# Patient Record
Sex: FEMALE | Race: BLACK OR AFRICAN AMERICAN | Employment: FULL TIME | URBAN - METROPOLITAN AREA
[De-identification: names, ages, dates, MRNs, and addresses within clinical notes are randomized per-mention and may not be internally consistent; named-entity substitution may affect disease eponyms.]

---

## 2024-07-09 ENCOUNTER — TELEPHONE (OUTPATIENT)
Age: 59
End: 2024-07-09

## 2024-07-09 NOTE — TELEPHONE ENCOUNTER
REFERRAL PENDING LABS, LISA ALLEN WE NEED LABS BEFORE AN MYRA CAN BE SCHEDULE. Fax request sent to [facility] [phone]

## 2024-07-15 ENCOUNTER — TELEPHONE (OUTPATIENT)
Age: 59
End: 2024-07-15

## 2024-07-15 NOTE — TELEPHONE ENCOUNTER
Reached out to patient to schedule a NP kimmie with [facility], advised patient that we are booked out until Jan for new patients, per patient she will hold off kimmie for right now, advised patient to reach back out to referring providers office to see if they are able to refer her to somewhere else to see if they maybe able to get her in sooner. Patient verbally understood.